# Patient Record
Sex: MALE | Race: WHITE | NOT HISPANIC OR LATINO | URBAN - METROPOLITAN AREA
[De-identification: names, ages, dates, MRNs, and addresses within clinical notes are randomized per-mention and may not be internally consistent; named-entity substitution may affect disease eponyms.]

---

## 2024-06-03 ENCOUNTER — TELEPHONE (OUTPATIENT)
Dept: TRANSPLANT | Facility: CLINIC | Age: 54
End: 2024-06-03

## 2024-06-03 DIAGNOSIS — I50.9 CONGESTIVE HEART FAILURE, UNSPECIFIED HF CHRONICITY, UNSPECIFIED HEART FAILURE TYPE: Primary | ICD-10-CM

## 2024-06-28 ENCOUNTER — OFFICE VISIT (OUTPATIENT)
Dept: TRANSPLANT | Facility: CLINIC | Age: 54
End: 2024-06-28
Payer: COMMERCIAL

## 2024-06-28 ENCOUNTER — HOSPITAL ENCOUNTER (OUTPATIENT)
Dept: CARDIOLOGY | Facility: HOSPITAL | Age: 54
Discharge: HOME | End: 2024-06-28
Attending: INTERNAL MEDICINE
Payer: COMMERCIAL

## 2024-06-28 VITALS
WEIGHT: 281.5 LBS | HEIGHT: 74 IN | SYSTOLIC BLOOD PRESSURE: 98 MMHG | DIASTOLIC BLOOD PRESSURE: 58 MMHG | HEART RATE: 74 BPM | BODY MASS INDEX: 36.13 KG/M2

## 2024-06-28 VITALS
HEART RATE: 66 BPM | HEIGHT: 74 IN | DIASTOLIC BLOOD PRESSURE: 56 MMHG | WEIGHT: 281.5 LBS | SYSTOLIC BLOOD PRESSURE: 95 MMHG | BODY MASS INDEX: 36.13 KG/M2

## 2024-06-28 DIAGNOSIS — I50.42 CHRONIC COMBINED SYSTOLIC AND DIASTOLIC HEART FAILURE: Primary | ICD-10-CM

## 2024-06-28 DIAGNOSIS — I50.42 CHRONIC COMBINED SYSTOLIC AND DIASTOLIC HEART FAILURE: ICD-10-CM

## 2024-06-28 DIAGNOSIS — I42.8 CARDIOMYOPATHY, NONISCHEMIC: ICD-10-CM

## 2024-06-28 DIAGNOSIS — G47.33 OSA ON CPAP: ICD-10-CM

## 2024-06-28 LAB
ASCENDING AORTA: 3.4 CM
AV INDEX (PROSTH): 0.63
AV MEAN GRADIENT: 2 MMHG
AV PEAK GRADIENT: 4 MMHG
AV VALVE AREA BY VELOCITY RATIO: 3.77 CM²
AV VALVE AREA: 3.94 CM²
AV VELOCITY RATIO: 0.61
BSA FOR ECHO PROCEDURE: 2.58 M2
CV ECHO LV RWT: 0.28 CM
DOP CALC AO PEAK VEL: 1.02 M/S
DOP CALC AO VTI: 18.63 CM
DOP CALC LVOT AREA: 6.2 CM2
DOP CALC LVOT DIAMETER: 2.81 CM
DOP CALC LVOT PEAK VEL: 0.62 M/S
DOP CALC LVOT STROKE VOLUME: 73.33 CM3
DOP CALCLVOT PEAK VEL VTI: 11.83 CM
E WAVE DECELERATION TIME: 188.84 MSEC
E/A RATIO: 0.6
E/E' RATIO: 5.6 M/S
ECHO LV POSTERIOR WALL: 0.98 CM (ref 0.6–1.1)
FRACTIONAL SHORTENING: 20 % (ref 28–44)
INTERVENTRICULAR SEPTUM: 0.98 CM (ref 0.6–1.1)
LA MAJOR: 6.13 CM
LA MINOR: 5.61 CM
LA WIDTH: 4.48 CM
LEFT ATRIUM SIZE: 4.84 CM
LEFT ATRIUM VOLUME INDEX MOD: 34.7 ML/M2
LEFT ATRIUM VOLUME INDEX: 43 ML/M2
LEFT ATRIUM VOLUME MOD: 87.01 CM3
LEFT ATRIUM VOLUME: 107.98 CM3
LEFT INTERNAL DIMENSION IN SYSTOLE: 5.5 CM (ref 2.1–4)
LEFT VENTRICLE DIASTOLIC VOLUME INDEX: 136.38 ML/M2
LEFT VENTRICLE DIASTOLIC VOLUME: 342.32 ML
LEFT VENTRICLE MASS INDEX: 122 G/M2
LEFT VENTRICLE SYSTOLIC VOLUME INDEX: 97.2 ML/M2
LEFT VENTRICLE SYSTOLIC VOLUME: 243.98 ML
LEFT VENTRICULAR INTERNAL DIMENSION IN DIASTOLE: 6.9 CM (ref 3.5–6)
LEFT VENTRICULAR MASS: 305.94 G
LV LATERAL E/E' RATIO: 5.25 M/S
LV SEPTAL E/E' RATIO: 6 M/S
MV PEAK A VEL: 0.7 M/S
MV PEAK E VEL: 0.42 M/S
MV STENOSIS PRESSURE HALF TIME: 54.76 MS
MV VALVE AREA P 1/2 METHOD: 4.02 CM2
OHS LV EJECTION FRACTION SIMPSONS BIPLANE MOD: 35 %
RA MAJOR: 4.41 CM
RA PRESSURE ESTIMATED: 3 MMHG
RA WIDTH: 3.65 CM
RIGHT VENTRICLE DIASTOLIC BASEL DIMENSION: 4 CM
SINUS: 3.52 CM
STJ: 2.65 CM
TDI LATERAL: 0.08 M/S
TDI SEPTAL: 0.07 M/S
TDI: 0.08 M/S
TRICUSPID ANNULAR PLANE SYSTOLIC EXCURSION: 1.47 CM
Z-SCORE OF LEFT VENTRICULAR DIMENSION IN END DIASTOLE: -7.29
Z-SCORE OF LEFT VENTRICULAR DIMENSION IN END SYSTOLE: -3.42

## 2024-06-28 PROCEDURE — 25500020 PHARM REV CODE 255: Performed by: INTERNAL MEDICINE

## 2024-06-28 PROCEDURE — 93306 TTE W/DOPPLER COMPLETE: CPT | Mod: 26,,, | Performed by: INTERNAL MEDICINE

## 2024-06-28 PROCEDURE — C8929 TTE W OR WO FOL WCON,DOPPLER: HCPCS

## 2024-06-28 PROCEDURE — 99999 PR PBB SHADOW E&M-EST. PATIENT-LVL IV: CPT | Mod: PBBFAC,,, | Performed by: INTERNAL MEDICINE

## 2024-06-28 RX ORDER — DAPAGLIFLOZIN 10 MG/1
10 TABLET, FILM COATED ORAL DAILY
COMMUNITY

## 2024-06-28 RX ORDER — CARVEDILOL 25 MG/1
25 TABLET ORAL 2 TIMES DAILY
COMMUNITY

## 2024-06-28 RX ORDER — IVABRADINE 5 MG/1
1 TABLET, FILM COATED ORAL 2 TIMES DAILY
COMMUNITY
Start: 2024-06-25

## 2024-06-28 RX ORDER — INDOMETHACIN 25 MG/1
25 CAPSULE ORAL
COMMUNITY
Start: 2023-12-06

## 2024-06-28 RX ORDER — RIMEGEPANT SULFATE 75 MG/75MG
75 TABLET, ORALLY DISINTEGRATING ORAL DAILY PRN
COMMUNITY
Start: 2023-12-06

## 2024-06-28 RX ORDER — ZOLPIDEM TARTRATE 10 MG/1
10 TABLET ORAL NIGHTLY PRN
COMMUNITY

## 2024-06-28 RX ORDER — PRAMIPEXOLE DIHYDROCHLORIDE 0.75 MG/1
0.75 TABLET ORAL NIGHTLY
COMMUNITY
Start: 2024-06-06

## 2024-06-28 RX ORDER — TIRZEPATIDE 15 MG/.5ML
15 INJECTION, SOLUTION SUBCUTANEOUS WEEKLY
COMMUNITY

## 2024-06-28 RX ORDER — OXYCODONE HYDROCHLORIDE 20 MG/1
1 TABLET ORAL
COMMUNITY

## 2024-06-28 RX ORDER — ALLOPURINOL 300 MG/1
1 TABLET ORAL EVERY MORNING
COMMUNITY
Start: 2023-12-06

## 2024-06-28 RX ORDER — ROSUVASTATIN CALCIUM 10 MG/1
10 TABLET, COATED ORAL NIGHTLY
COMMUNITY

## 2024-06-28 RX ORDER — SPIRONOLACTONE 25 MG/1
25 TABLET ORAL NIGHTLY
COMMUNITY
Start: 2024-06-26

## 2024-06-28 RX ORDER — SACUBITRIL AND VALSARTAN 97; 103 MG/1; MG/1
1 TABLET, FILM COATED ORAL 2 TIMES DAILY
COMMUNITY

## 2024-06-28 RX ADMIN — HUMAN ALBUMIN MICROSPHERES AND PERFLUTREN 0.66 MG: 10; .22 INJECTION, SOLUTION INTRAVENOUS at 02:06

## 2024-06-28 NOTE — PROGRESS NOTES
Procedure explained. 22 g sl started in left foreaem for optison use, optison given ivp via sl for imaging. Tolerated well. Sl d/juvenal after. Pressure applied.  Failed attempt at iv in left forearm. Pressure applied.

## 2025-01-02 DIAGNOSIS — I50.42 CHRONIC COMBINED SYSTOLIC AND DIASTOLIC HEART FAILURE: Primary | ICD-10-CM

## 2025-01-17 ENCOUNTER — OFFICE VISIT (OUTPATIENT)
Dept: TRANSPLANT | Facility: CLINIC | Age: 55
End: 2025-01-17
Payer: COMMERCIAL

## 2025-01-17 ENCOUNTER — HOSPITAL ENCOUNTER (OUTPATIENT)
Dept: CARDIOLOGY | Facility: HOSPITAL | Age: 55
Discharge: HOME | End: 2025-01-17
Attending: INTERNAL MEDICINE
Payer: COMMERCIAL

## 2025-01-17 VITALS
WEIGHT: 284.38 LBS | HEART RATE: 83 BPM | SYSTOLIC BLOOD PRESSURE: 104 MMHG | DIASTOLIC BLOOD PRESSURE: 69 MMHG | HEIGHT: 74 IN | BODY MASS INDEX: 36.5 KG/M2

## 2025-01-17 VITALS
HEIGHT: 74 IN | WEIGHT: 285.25 LBS | BODY MASS INDEX: 36.61 KG/M2 | SYSTOLIC BLOOD PRESSURE: 104 MMHG | HEART RATE: 83 BPM | DIASTOLIC BLOOD PRESSURE: 69 MMHG

## 2025-01-17 DIAGNOSIS — I50.42 CHRONIC COMBINED SYSTOLIC AND DIASTOLIC HEART FAILURE: ICD-10-CM

## 2025-01-17 DIAGNOSIS — I42.8 CARDIOMYOPATHY, NONISCHEMIC: ICD-10-CM

## 2025-01-17 DIAGNOSIS — G47.33 OSA (OBSTRUCTIVE SLEEP APNEA): ICD-10-CM

## 2025-01-17 DIAGNOSIS — E66.01 CLASS 2 SEVERE OBESITY DUE TO EXCESS CALORIES WITH SERIOUS COMORBIDITY AND BODY MASS INDEX (BMI) OF 36.0 TO 36.9 IN ADULT: ICD-10-CM

## 2025-01-17 DIAGNOSIS — E66.812 CLASS 2 SEVERE OBESITY DUE TO EXCESS CALORIES WITH SERIOUS COMORBIDITY AND BODY MASS INDEX (BMI) OF 36.0 TO 36.9 IN ADULT: ICD-10-CM

## 2025-01-17 DIAGNOSIS — I50.42 CHRONIC COMBINED SYSTOLIC AND DIASTOLIC HEART FAILURE: Primary | ICD-10-CM

## 2025-01-17 LAB
ASCENDING AORTA: 3 CM
AV AREA BY CONTINUOUS VTI: 4 CM2
AV INDEX (PROSTH): 0.79
AV LVOT MEAN GRADIENT: 1 MMHG
AV LVOT PEAK GRADIENT: 2 MMHG
AV MEAN GRADIENT: 2 MMHG
AV PEAK GRADIENT: 4 MMHG
AV VALVE AREA BY VELOCITY RATIO: 4.2 CM²
AV VALVE AREA: 4.2 CM2
AV VELOCITY RATIO: 0.8
BSA FOR ECHO PROCEDURE: 2.6 M2
CV ECHO LV RWT: 0.49 CM
DOP CALC AO PEAK VEL: 1 M/S
DOP CALC AO VTI: 19.2 CM
DOP CALC LVOT AREA: 5.3 CM2
DOP CALC LVOT DIAMETER: 2.6 CM
DOP CALC LVOT PEAK VEL: 0.8 M/S
DOP CALC LVOT STROKE VOLUME: 80.1 CM3
DOP CALCLVOT PEAK VEL VTI: 15.1 CM
E WAVE DECELERATION TIME: 202 MS
E/A RATIO: 0.76
E/E' RATIO: 6 M/S
ECHO EF ESTIMATED: 34 %
ECHO LV POSTERIOR WALL: 1.2 CM (ref 0.6–1.1)
FRACTIONAL SHORTENING: 16.3 % (ref 28–44)
GLOBAL LONGITUIDAL STRAIN: 14 %
INTERVENTRICULAR SEPTUM: 0.8 CM (ref 0.6–1.1)
IVC DIAMETER: 1.61 CM
IVRT: 111 MS
LA MAJOR: 5.1 CM
LA MINOR: 5.2 CM
LA WIDTH: 3.7 CM
LEFT ATRIUM SIZE: 4.5 CM
LEFT ATRIUM VOLUME INDEX MOD: 18 ML/M2
LEFT ATRIUM VOLUME INDEX: 29 ML/M2
LEFT ATRIUM VOLUME MOD: 46 ML
LEFT ATRIUM VOLUME: 73 CM3
LEFT INTERNAL DIMENSION IN SYSTOLE: 4.1 CM (ref 2.1–4)
LEFT VENTRICLE DIASTOLIC VOLUME INDEX: 45.03 ML/M2
LEFT VENTRICLE DIASTOLIC VOLUME: 113.48 ML
LEFT VENTRICLE MASS INDEX: 69.9 G/M2
LEFT VENTRICLE SYSTOLIC VOLUME INDEX: 29.9 ML/M2
LEFT VENTRICLE SYSTOLIC VOLUME: 75.42 ML
LEFT VENTRICULAR INTERNAL DIMENSION IN DIASTOLE: 4.9 CM (ref 3.5–6)
LEFT VENTRICULAR MASS: 176 G
LV LATERAL E/E' RATIO: 5.6
LV SEPTAL E/E' RATIO: 6.3
MV A" WAVE DURATION": 128.45 MS
MV PEAK A VEL: 0.66 M/S
MV PEAK E VEL: 0.5 M/S
OHS CV RV/LV RATIO: 0.61 CM
OHS LV EJECTION FRACTION SIMPSONS BIPLANE MOD: 44 %
PULM VEIN A" WAVE DURATION": 128.45 MS
PULM VEIN S/D RATIO: 1.06
PULMONIC VEIN PEAK A VELOCITY: 0.3 M/S
PV PEAK D VEL: 0.34 M/S
PV PEAK S VEL: 0.36 M/S
RA MAJOR: 4.27 CM
RA PRESSURE ESTIMATED: 3 MMHG
RA WIDTH: 3.67 CM
RIGHT ATRIAL AREA: 12.3 CM2
RIGHT VENTRICLE DIASTOLIC BASEL DIMENSION: 3 CM
RV TISSUE DOPPLER FREE WALL SYSTOLIC VELOCITY 1 (APICAL 4 CHAMBER VIEW): 13.28 CM/S
SINUS: 3.2 CM
STJ: 2.82 CM
TDI LATERAL: 0.09 M/S
TDI SEPTAL: 0.08 M/S
TDI: 0.09 M/S
TRICUSPID ANNULAR PLANE SYSTOLIC EXCURSION: 1.01 CM
Z-SCORE OF LEFT VENTRICULAR DIMENSION IN END DIASTOLE: -10.98
Z-SCORE OF LEFT VENTRICULAR DIMENSION IN END SYSTOLE: -5.94

## 2025-01-17 PROCEDURE — 93306 TTE W/DOPPLER COMPLETE: CPT | Mod: 26,,, | Performed by: INTERNAL MEDICINE

## 2025-01-17 PROCEDURE — 25500020 PHARM REV CODE 255: Performed by: INTERNAL MEDICINE

## 2025-01-17 PROCEDURE — 99215 OFFICE O/P EST HI 40 MIN: CPT | Mod: S$GLB,,, | Performed by: INTERNAL MEDICINE

## 2025-01-17 PROCEDURE — C8929 TTE W OR WO FOL WCON,DOPPLER: HCPCS

## 2025-01-17 PROCEDURE — 99999 PR PBB SHADOW E&M-EST. PATIENT-LVL III: CPT | Mod: PBBFAC,,, | Performed by: INTERNAL MEDICINE

## 2025-01-17 PROCEDURE — 93356 MYOCRD STRAIN IMG SPCKL TRCK: CPT | Mod: ,,, | Performed by: INTERNAL MEDICINE

## 2025-01-17 RX ORDER — FUROSEMIDE 20 MG/1
20 TABLET ORAL
COMMUNITY
Start: 2024-11-21

## 2025-01-17 RX ORDER — VARENICLINE TARTRATE 1 MG/1
TABLET, FILM COATED ORAL
COMMUNITY
Start: 2024-11-06

## 2025-01-17 RX ADMIN — HUMAN ALBUMIN MICROSPHERES AND PERFLUTREN 0.66 MG: 10; .22 INJECTION, SOLUTION INTRAVENOUS at 01:01

## 2025-01-17 NOTE — PROGRESS NOTES
Procedure explained. 22 g sl started in left forearm for contrast use. Optison given ivp via sl for imaging via sl by jeb barraza [facility]. Tolerated well. Sl d/juvenal after. Pressure applied.

## 2025-01-17 NOTE — PROGRESS NOTES
Subjective:     HPI:  Mr. Martinez is a 54 y.o. year old  male with stage C HFrEF (EF=20%, LVEDD=6.02 cm echo was done locally), NICMP diagnosed about 12 years ago who is referred by our colleague Dr. Krunal Drew. This is his 2nd visit with me. During hsi 1st visit, he was deemed medically stable and not needing advanced HF therapies. He continues to do well. Clinically he reports NYHA class II symptoms. Occasional PND and orthopnea. He is on a excellent HF regimen that includes; Entresto 97/103 mg twice daily, carvedilol 25 mg twice daily, spironolactone 25 mg daily, Dapagliflozin 10 mg daily and Corlanor 5 mg twice daily.  He has not had any recnet HF admissions or ED visits. He does not have an ICD (his Echo done here was 30-35%  and had LV recovery in the past so I recommended to not proceed with OCD implant at that time since his GDMT had just being optimized and his EF had already improved to 35% range). Labs reviewed today and are ALL WNL.      2D Echo with CFD done today  Left Ventricle: The left ventricle is moderately dilated. Normal wall thickness. There is eccentric hypertrophy. Global hypokinesis present. There is moderately reduced systolic function with a visually estimated ejection fraction of 30 - 35%. Grade I diastolic dysfunction. Normal left ventricular filling pressure.    Right Ventricle: Normal right ventricular cavity size. Wall thickness is normal. Systolic function is mildly reduced.    The left atrium is moderately dilated.    IVC/SVC: Normal venous pressure at 3 mmHg.    Past Medical History:   Diagnosis Date    Cardiomyopathy     Cardiomyopathy, nonischemic 06/28/2024    CHF (congestive heart failure)     Gout, unspecified     WELLINGTON (obstructive sleep apnea)      Past Surgical History:   Procedure Laterality Date    FOOT SURGERY Bilateral     LEG SURGERY Left        Review of Systems   Constitutional: Negative. Negative for chills, decreased appetite, diaphoresis, fever, malaise/fatigue,  "night sweats, weight gain and weight loss.   Eyes: Negative.    Cardiovascular:  Negative for chest pain, claudication, cyanosis, dyspnea on exertion, irregular heartbeat, leg swelling, near-syncope, orthopnea, palpitations, paroxysmal nocturnal dyspnea and syncope.   Respiratory:  Negative for cough, hemoptysis and shortness of breath.    Endocrine: Negative.    Hematologic/Lymphatic: Negative.    Skin:  Negative for color change, dry skin and nail changes.   Musculoskeletal: Negative.    Gastrointestinal: Negative.    Genitourinary: Negative.    Neurological:  Negative for weakness.       Objective:   Blood pressure 104/69, pulse 83, height 6' 2" (1.88 m), weight 129.4 kg (285 lb 4.4 oz).body mass index is 36.63 kg/m².  Physical Exam  Vitals reviewed.   Constitutional:       Appearance: He is well-developed.      Comments: /69   Pulse 83   Ht 6' 2" (1.88 m)   Wt 129.4 kg (285 lb 4.4 oz)   BMI 36.63 kg/m²      HENT:      Head: Normocephalic.   Neck:      Vascular: No carotid bruit or JVD.   Cardiovascular:      Rate and Rhythm: Regular rhythm.      Chest Wall: PMI is displaced.      Pulses: Normal pulses.      Heart sounds: Normal heart sounds. No murmur heard.  Pulmonary:      Effort: Pulmonary effort is normal.      Breath sounds: Normal breath sounds.   Abdominal:      General: Bowel sounds are normal.      Palpations: Abdomen is soft.   Skin:     General: Skin is warm.   Neurological:      Mental Status: He is alert.         Labs:    Chemistry        Component Value Date/Time     01/17/2025 0929    K 4.1 01/17/2025 0929     01/17/2025 0929    CO2 22 (L) 01/17/2025 0929    BUN 12 01/17/2025 0929    CREATININE 0.8 01/17/2025 0929    GLU 78 01/17/2025 0929        Component Value Date/Time    CALCIUM 9.1 01/17/2025 0929    ALKPHOS 78 06/28/2024 1203    AST 18 06/28/2024 1203    ALT 15 06/28/2024 1203    BILITOT 0.7 06/28/2024 1203    ESTGFRAFRICA 153 11/02/2020 0639          Magnesium   Date " Value Ref Range Status   06/28/2024 2.2 1.6 - 2.6 mg/dL Final     Lab Results   Component Value Date    WBC 9 12/05/2024    HGB 17 12/05/2024    HCT 51.4 (H) 12/05/2024     12/05/2024     Lab Results   Component Value Date    INR 1.0 10/24/2020     BNP   Date Value Ref Range Status   06/28/2024 46 0 - 99 pg/mL Final     Comment:     Values of less than 100 pg/ml are consistent with non-CHF populations.   10/28/2020 14 (L) 15 - 73 PG/ML Final       Assessment:      1. Chronic combined systolic and diastolic heart failure    2. Cardiomyopathy, nonischemic    3. WELLINGTON (obstructive sleep apnea)    4. Class 2 severe obesity due to excess calories with serious comorbidity and body mass index (BMI) of 36.0 to 36.9 in adult        Plan:   Stage C HFrEF, NICMP with NYHA class II symptoms. On optimal GDMT  At this time, he is medically stable and still does not need advanced HF therapies.   Continue current HF regimen   Repeat echo to reevaluate Lv EF now that he has been on optimal GDMT for months.   Recommend 2 gram sodium restriction and 1500cc fluid restriction.  Encourage physical activity with graded exercise program.  Requested patient to weigh themselves daily, and to notify us if their weight increases by more than 3 lbs in 1 day or 5 lbs in 1 week.   Thank you for allowing me leanne participate in the care of this patient. Do not hesitate to contact me should you have any questions.   Echo today or next week  RTC in 6 months with labs         Advance Care Planning  Please see prior ACPs completed during his visit with me   Date: 06/28/2024     Power of   I initiated the process of voluntary advance care planning today and explained the importance of this process to the patient.  I introduced the concept of advance directives to the patient, as well. Then the patient received detailed information about the importance of designating a Health Care Power of  (HCPOA). He was also instructed to  communicate with this person about their wishes for future healthcare, should he become sick and lose decision-making capacity. The patient has not previously appointed a HCPOA. After our discussion, the patient has decided to complete a HCPOA and has appointed his significant other, health care agent: Violet Martinez.      Heraclio Duarte MD